# Patient Record
(demographics unavailable — no encounter records)

---

## 2024-10-28 NOTE — PHYSICAL EXAM
[Alert] : alert [Well Nourished] : well nourished [No Acute Distress] : no acute distress [Well Developed] : well developed [Normal Sclera/Conjunctiva] : normal sclera/conjunctiva [EOMI] : extra ocular movement intact [No Proptosis] : no proptosis [Normal Hearing] : hearing was normal [Normal Oropharynx] : the oropharynx was normal [Supple] : the neck was supple [Thyroid Not Enlarged] : the thyroid was not enlarged [No Thyroid Nodules] : no palpable thyroid nodules [No Respiratory Distress] : no respiratory distress [No Accessory Muscle Use] : no accessory muscle use [Clear to Auscultation] : lungs were clear to auscultation bilaterally [Normal S1, S2] : normal S1 and S2 [Normal Rate] : heart rate was normal [Regular Rhythm] : with a regular rhythm [No Edema] : no peripheral edema [Pedal Pulses Normal] : the pedal pulses are present [Normal Bowel Sounds] : normal bowel sounds [Not Tender] : non-tender [Not Distended] : not distended [Soft] : abdomen soft [Spine Straight] : spine straight [No Stigmata of Cushings Syndrome] : no stigmata of Cushings Syndrome [Normal Gait] : normal gait [Normal Strength/Tone] : muscle strength and tone were normal [No Rash] : no rash [Normal Reflexes] : deep tendon reflexes were 2+ and symmetric [No Tremors] : no tremors [Oriented x3] : oriented to person, place, and time [Normal Affect] : the affect was normal [Normal Mood] : the mood was normal

## 2024-10-30 NOTE — HISTORY OF PRESENT ILLNESS
[FreeTextEntry1] : 25 yo M with PMH of recurrent nephrolithiasis here for hypogonadism evaluation.  Initially saw Urology for low energy in 3/2022, testosterone prior to treatment was in 200s. Biggest complaint was fatigue and difficulty losing weight. Morning erections only present occasionally previously, improved on testosterone replacement.  Was started on testosterone in 4/2022.  Has never seen an endocrinologist.  No issues with libido including prior to testosterone start. Was using 4 pumps androgel daily from Urology. Had scrotal US in 2022 with small hernia on left side; no varicoceles  Reports prior trauma to left testes in college. Hx of concussions in college   Open to coming off testosterone for several weeks for further workup. 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  High risk for MOSHE - STOP bang score 4   Pending MOSHE eval with pulm  Not interested in fertility currently but wants to have kids in the future. No prior pituitary imaging evaluation.  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 Calculated free testosterone with LOW SHBG (?obesity) is normal at 5.29 ng/dL (normal ref range 5.05-19 ng/dL). Bioavailable testosterone is 135 ng/dL (66.5%) (normal ref range  ng/dL). am cortisol low at 5 but drawn too late.  5/2023 repeat AM testosterone similar to prior, SHBG low. Calculated free and bioavailable T wnl for age. Continue off testosterone and work on weight loss.  Repeat labs after visit: normal cortisol 13.6.  Repeat fasting testosterone 9AM - total still low 223, SHBG pending. Using recent SHBG, calculated free testosterone is NORMAL at 5.84 ng/dL and bioavailable testosterone is 149 ng/dL (66%) which is NORMAL for age.  Interval hx: On CPAP from MOSHE since 10/2023, seeing pulm notes bad allergies with gf cats  traveling a lot back and forth to [place] by driving, mostly living there  weight 236 lbs ->224 lbs today  hikes every other week putting more vegetables in meals and decreasing protein  had lipid panel done with PCP last week, was told they were "elevated" but not as high as in April when they were 404  Normally takes vit D 2000 iu, E, B complex and fish oil No other supplements

## 2024-10-30 NOTE — HISTORY OF PRESENT ILLNESS
[FreeTextEntry1] : 25 yo M with PMH of recurrent nephrolithiasis here for hypogonadism evaluation.  Initially saw Urology for low energy in 3/2022, testosterone prior to treatment was in 200s. Biggest complaint was fatigue and difficulty losing weight. Morning erections only present occasionally previously, improved on testosterone replacement.  Was started on testosterone in 4/2022.  Has never seen an endocrinologist.  No issues with libido including prior to testosterone start. Was using 4 pumps androgel daily from Urology. Had scrotal US in 2022 with small hernia on left side; no varicoceles  Reports prior trauma to left testes in college. Hx of concussions in college   Open to coming off testosterone for several weeks for further workup. 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  High risk for MOSHE - STOP bang score 4   Pending MOSHE eval with pulm  Not interested in fertility currently but wants to have kids in the future. No prior pituitary imaging evaluation.  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 Calculated free testosterone with LOW SHBG (?obesity) is normal at 5.29 ng/dL (normal ref range 5.05-19 ng/dL). Bioavailable testosterone is 135 ng/dL (66.5%) (normal ref range  ng/dL). am cortisol low at 5 but drawn too late.  5/2023 repeat AM testosterone similar to prior, SHBG low. Calculated free and bioavailable T wnl for age. Continue off testosterone and work on weight loss.  Repeat labs after visit: normal cortisol 13.6.  Repeat fasting testosterone 9AM - total still low 223, SHBG pending. Using recent SHBG, calculated free testosterone is NORMAL at 5.84 ng/dL and bioavailable testosterone is 149 ng/dL (66%) which is NORMAL for age.  Interval hx: On CPAP from MOSHE since 10/2023, seeing pulm notes bad allergies with gf cats  traveling a lot back and forth to [email or web address] by driving, mostly living there  weight 236 lbs ->224 lbs today  hikes every other week putting more vegetables in meals and decreasing protein  had lipid panel done with PCP last week, was told they were "elevated" but not as high as in April when they were 404  Normally takes vit D 2000 iu, E, B complex and fish oil No other supplements

## 2024-10-30 NOTE — ASSESSMENT
[FreeTextEntry1] : 27 yo M with PMH of recurrent nephrolithiasis here for hypogonadism evaluation. Pt does not have symptoms of hypogonadism.  #low total testosterone: had total testosterone 223 (low) at 10:30 AM, repeat 275 was drawn at 2PM. Only symptom previously at time of check was low energy. LH and FSH normal but drawn at 2PM. Pt was started on androgel 4 pumps daily in 4/2022 by urology. Discussed stopping testosterone replacement to complete hypogonadism workup. He stopped this after initial visit with me, has been off for several months. - Labs drawn after stopping testosterone for several months were drawn too late in the day, even with that, he has a low SHBG (likely due to obesity; no hx of liver disease or DM), causing low serum total testosterone. His calculated free testosterone and bioavailable testosterone are NORMAL for age. Discussed that weight loss can improve his testosterone profile. Additionally, MOSHE treatment (cpap if needed, and weight loss) will help. Repeat fasting AM labs drawn at 9am show similar profile with normal calculated free testosterone and bioavailable testosterone. The issue here is the SHBG causing his total testosterone to appear low. His pituitary panel is normal. Low concern for pituitary pathology. Repeat AM cortisol was normal. - recommend continuing off testosterone, continuing to work on weight loss, also needs MOSHE evaluation which he is getting - labs to be drawn in AM - testosterone panel. SHBG, LH, FSH  - pt desires future fertility - pt does NOT need testosterone replacement - follow up with pulm for MOSHE management - TFTs, PRL previously normal - semen analysis collected while on testosterone showing lower motility. Can defer repeating at this time, consider at later date if pursuing fertility management - scrotal US per chart review - no varicocele - reviewed meds - not taking any androgens otherwise or herbal supplements  #vitamin d deficiency - continue vit D supplement - hx of kidney stones - serum calcium and PTH normal  #BMI 34, class 1 obesity - high risk for MOSHE as above; SHBG is low - check A1c, lipids, CMP - has hypertriglyceridemia - discussed importance of increased exercise and weight loss to improve metabolic profile  RTC 6 months  Case seen and d/w Dr. Catracho Alvares MD Endocrinology Fellow

## 2024-10-30 NOTE — END OF VISIT
[Time Spent: ___ minutes] : I have spent [unfilled] minutes of time on the encounter which excludes teaching and separately reported services. [] : Fellow [FreeTextEntry3] : 25 yo M with obesity related hypogonadism, MOSHE, with improvement in labs with weight loss. Check fasting labs, continue weight loss